# Patient Record
Sex: MALE
[De-identification: names, ages, dates, MRNs, and addresses within clinical notes are randomized per-mention and may not be internally consistent; named-entity substitution may affect disease eponyms.]

---

## 2020-04-24 PROBLEM — Z00.00 ENCOUNTER FOR PREVENTIVE HEALTH EXAMINATION: Status: ACTIVE | Noted: 2020-04-24

## 2020-04-29 ENCOUNTER — RESULT CHARGE (OUTPATIENT)
Age: 30
End: 2020-04-29

## 2020-04-29 ENCOUNTER — APPOINTMENT (OUTPATIENT)
Dept: UROLOGY | Facility: CLINIC | Age: 30
End: 2020-04-29
Payer: COMMERCIAL

## 2020-04-29 VITALS
SYSTOLIC BLOOD PRESSURE: 132 MMHG | WEIGHT: 154 LBS | DIASTOLIC BLOOD PRESSURE: 88 MMHG | RESPIRATION RATE: 16 BRPM | HEART RATE: 79 BPM

## 2020-04-29 DIAGNOSIS — R39.16 STRAINING TO VOID: ICD-10-CM

## 2020-04-29 DIAGNOSIS — F17.200 NICOTINE DEPENDENCE, UNSPECIFIED, UNCOMPLICATED: ICD-10-CM

## 2020-04-29 DIAGNOSIS — Z84.1 FAMILY HISTORY OF DISORDERS OF KIDNEY AND URETER: ICD-10-CM

## 2020-04-29 DIAGNOSIS — R39.15 URGENCY OF URINATION: ICD-10-CM

## 2020-04-29 PROCEDURE — 51798 US URINE CAPACITY MEASURE: CPT

## 2020-04-29 PROCEDURE — 99204 OFFICE O/P NEW MOD 45 MIN: CPT | Mod: 25

## 2020-04-29 PROCEDURE — 99406 BEHAV CHNG SMOKING 3-10 MIN: CPT

## 2020-04-29 NOTE — PHYSICAL EXAM
[General Appearance - Well Developed] : well developed [Normal Appearance] : normal appearance [General Appearance - Well Nourished] : well nourished [Edema] : no peripheral edema [General Appearance - In No Acute Distress] : no acute distress [Well Groomed] : well groomed [Respiration, Rhythm And Depth] : normal respiratory rhythm and effort [Abdomen Tenderness] : non-tender [Exaggerated Use Of Accessory Muscles For Inspiration] : no accessory muscle use [Abdomen Soft] : soft [Costovertebral Angle Tenderness] : no ~M costovertebral angle tenderness [Urethral Meatus] : meatus normal [Scrotum] : the scrotum was normal [Testes Mass (___cm)] : there were no testicular masses [Urinary Bladder Findings] : the bladder was normal on palpation [Normal Station and Gait] : the gait and station were normal for the patient's age [] : no rash [No Focal Deficits] : no focal deficits [Affect] : the affect was normal [Mood] : the mood was normal [Oriented To Time, Place, And Person] : oriented to person, place, and time [Not Anxious] : not anxious [No Palpable Adenopathy] : no palpable adenopathy [FreeTextEntry1] : We discussed rectal examination, and pt prefers to avoid for now unless issues arise.

## 2020-04-29 NOTE — ASSESSMENT
[FreeTextEntry1] : u/a dip today wnl\par bladder scan: 0 cc pvr\par \par D/w pt at length; no objective evidence for obstruction, infection, blood in the urine.  Additional evaluations to rule out specific findings such as obstruction, stricture, mass would involve more invasive testing, including Digital rectal exam and/or cystoscopy.\par \par 1. Will await urinary microscopy and culture- sent today\par 2. Would recommend stop smoking, and to dramatically decrease caffeine intake to reassess\par 3. RTC in ~ 2 months with repeat u/a, bladder scan, and possibly any additional indicated evaluation if no improvement with conservative methods

## 2020-04-29 NOTE — HISTORY OF PRESENT ILLNESS
[Urinary Frequency] : urinary frequency [Urinary Urgency] : urinary urgency [Straining] : straining [FreeTextEntry1] : Pt is 30 yo male with several month history of 'difficulty urinating". He notes straining to urinate, urge/sensation of incomplete emptying, some frequency. No hematuria. No dysuria which is persistent. No recent sexual activity. No h/o urethral catheterization, surgery, or straddle-type injury. No fever, no nausea, no flank or abdominal pain.\par \par He does report FH of kidney stones in father and paternal uncle.\par PSH: none\par PMH: no sig\par Meds: none\par Allergies: PCN, rash/anaphylaxis\par SH: not sexually active currently, average caffeine intake 2-4 mugs of coffee/tea, 1 ppd smoking history

## 2020-04-29 NOTE — REVIEW OF SYSTEMS
[Constipation] : constipation [Abdominal Pain] : abdominal pain [Diarrhea] : diarrhea [Urine retention] : urine retention [Strong urge to urinate] : strong urge to urinate [Interrupted urine stream] : interrupted urine stream [Bladder fullness after urinating] : bladder fullness after urinating [Leakage of urine with urgency] : leakage of urine with urgency [Leakage of urine with straining, coughing, laughing] : leakage of urine with straining, coughing, laughing [Negative] : Heme/Lymph

## 2020-04-30 DIAGNOSIS — R31.29 OTHER MICROSCOPIC HEMATURIA: ICD-10-CM

## 2020-04-30 LAB
APPEARANCE: ABNORMAL
BACTERIA UR CULT: NORMAL
BACTERIA: NEGATIVE
BILIRUBIN URINE: NEGATIVE
BLOOD URINE: NEGATIVE
CALCIUM OXALATE CRYSTALS: ABNORMAL
COLOR: YELLOW
GLUCOSE QUALITATIVE U: NEGATIVE
HYALINE CASTS: 0 /LPF
KETONES URINE: NEGATIVE
LEUKOCYTE ESTERASE URINE: NEGATIVE
MICROSCOPIC-UA: NORMAL
NITRITE URINE: NEGATIVE
PH URINE: 6
PROTEIN URINE: NORMAL
RED BLOOD CELLS URINE: 3 /HPF
SPECIFIC GRAVITY URINE: 1.03
SQUAMOUS EPITHELIAL CELLS: 0 /HPF
UROBILINOGEN URINE: NORMAL
WHITE BLOOD CELLS URINE: 1 /HPF

## 2020-09-02 ENCOUNTER — APPOINTMENT (OUTPATIENT)
Dept: UROLOGY | Facility: CLINIC | Age: 30
End: 2020-09-02